# Patient Record
Sex: FEMALE | Race: WHITE
[De-identification: names, ages, dates, MRNs, and addresses within clinical notes are randomized per-mention and may not be internally consistent; named-entity substitution may affect disease eponyms.]

---

## 2020-05-03 ENCOUNTER — HOSPITAL ENCOUNTER (INPATIENT)
Dept: HOSPITAL 54 - ER | Age: 40
Discharge: LEFT BEFORE DISCHARGE | DRG: 282 | End: 2020-05-03
Attending: NURSE PRACTITIONER | Admitting: NURSE PRACTITIONER
Payer: COMMERCIAL

## 2020-05-03 VITALS — BODY MASS INDEX: 34.93 KG/M2 | HEIGHT: 61 IN | WEIGHT: 185 LBS

## 2020-05-03 VITALS — SYSTOLIC BLOOD PRESSURE: 111 MMHG | DIASTOLIC BLOOD PRESSURE: 40 MMHG

## 2020-05-03 DIAGNOSIS — I21.4: Primary | ICD-10-CM

## 2020-05-03 DIAGNOSIS — E03.9: ICD-10-CM

## 2020-05-03 DIAGNOSIS — E66.9: ICD-10-CM

## 2020-05-03 DIAGNOSIS — Z91.19: ICD-10-CM

## 2020-05-03 LAB
BASOPHILS # BLD AUTO: 0 /CMM (ref 0–0.2)
BASOPHILS NFR BLD AUTO: 0.6 % (ref 0–2)
BUN SERPL-MCNC: 10 MG/DL (ref 7–18)
CALCIUM SERPL-MCNC: 8.9 MG/DL (ref 8.5–10.1)
CHLORIDE SERPL-SCNC: 104 MMOL/L (ref 98–107)
CO2 SERPL-SCNC: 27 MMOL/L (ref 21–32)
CREAT SERPL-MCNC: 0.6 MG/DL (ref 0.6–1.3)
EOSINOPHIL NFR BLD AUTO: 2.9 % (ref 0–6)
GLUCOSE SERPL-MCNC: 106 MG/DL (ref 74–106)
HCT VFR BLD AUTO: 40 % (ref 33–45)
HGB BLD-MCNC: 13 G/DL (ref 11.5–14.8)
LYMPHOCYTES NFR BLD AUTO: 2 /CMM (ref 0.8–4.8)
LYMPHOCYTES NFR BLD AUTO: 28.8 % (ref 20–44)
MCHC RBC AUTO-ENTMCNC: 33 G/DL (ref 31–36)
MCV RBC AUTO: 81 FL (ref 82–100)
MONOCYTES NFR BLD AUTO: 0.4 /CMM (ref 0.1–1.3)
MONOCYTES NFR BLD AUTO: 5.3 % (ref 2–12)
NEUTROPHILS # BLD AUTO: 4.2 /CMM (ref 1.8–8.9)
NEUTROPHILS NFR BLD AUTO: 62.4 % (ref 43–81)
PLATELET # BLD AUTO: 250 /CMM (ref 150–450)
POTASSIUM SERPL-SCNC: 3.5 MMOL/L (ref 3.5–5.1)
RBC # BLD AUTO: 4.91 MIL/UL (ref 4–5.2)
SODIUM SERPL-SCNC: 142 MMOL/L (ref 136–145)
WBC NRBC COR # BLD AUTO: 6.8 K/UL (ref 4.3–11)

## 2020-05-03 PROCEDURE — G0378 HOSPITAL OBSERVATION PER HR: HCPCS

## 2020-05-03 NOTE — NUR
RN NOTES



PATIENT CHANGED HER MIND AND REFUSED TO TAKE ATORVASTATIN, WASTED THE MED PER PROTOCOL. 
WITNESS BY ELIGIO PYLE. 

-------------------------------------------------------------------------------

Addendum: 05/03/20 at 0959 by DAISY BUITRAGO RN

-------------------------------------------------------------------------------

EXPLAINED THE RISKS AND BENEFITS BUT PATIENT STILL REFUSED MEDICATION.

## 2020-05-03 NOTE — NUR
RN ADMITTING NOTES



ADMITTING A 38 Y/O FEMALE, A/O X4. ON ROOM AIR, TOLERATING WELL, ABLE TO MAKE NEEDS KNOWN. 
PATIENT ORIENTED TO ROOM, UNIT AND STAFF. IV ACCESS ON LAC #20, PATENT AND INTACT. V/S 
TAKEN, STABLE AND RECORDED. SKIN IS INTACT. LUNGS CLEAR ON AUSCULTATION. SAFETY MEASURES IN 
PLACE, BED IN LOWEST LOCKED POSITION WITH SIDE RAILS UP X2. CALL LIGHT PLACED WITHIN EASY 
REACH. WILL CONTINUE TO MONITOR.

## 2020-05-03 NOTE — NUR
PT BIBRA C/O MIDSTERNAL PRESSURE-LIKE CHEST PAIN RADIATING TO LEFT ARM. PT 
ENDORSES NUMBNESS ON THE LLE. -SOB. +N/V +HA. PT CONNECTED TO THE CARDIAC 
MONITOR AND POX.

## 2020-05-03 NOTE — NUR
ELIGIO STRONG NOTES



PATIENT WANTS TO LEAVE AMA, PER PATIENT SHE WANTS TO GO TO ANOTHER HOSPITAL BECAUSE ONE OF 
HER FAMILY FRIEND IS A CARDIOLOGIST AT THAT HOSPITAL. EXPLAINED THE RISKS BUT PATIENT STILL 
REFUSED. PATIENT IS A/O X4.  PATIENT SIGNED THE AMA FORM. ASSISTED TO GO TO THE LOBBY, WENT 
AMBULATORY AND PICKED UP BY .

## 2020-05-13 ENCOUNTER — HOSPITAL ENCOUNTER (EMERGENCY)
Dept: HOSPITAL 54 - ER | Age: 40
Discharge: ANOTHER HOSPITAL | End: 2020-05-13
Payer: COMMERCIAL

## 2020-05-13 VITALS — BODY MASS INDEX: 34.93 KG/M2 | WEIGHT: 185 LBS | HEIGHT: 61 IN

## 2020-05-13 VITALS — DIASTOLIC BLOOD PRESSURE: 86 MMHG | SYSTOLIC BLOOD PRESSURE: 132 MMHG

## 2020-05-13 DIAGNOSIS — Z95.5: ICD-10-CM

## 2020-05-13 DIAGNOSIS — Z88.0: ICD-10-CM

## 2020-05-13 DIAGNOSIS — Z79.899: ICD-10-CM

## 2020-05-13 DIAGNOSIS — E86.0: ICD-10-CM

## 2020-05-13 DIAGNOSIS — R94.31: ICD-10-CM

## 2020-05-13 DIAGNOSIS — Z98.890: ICD-10-CM

## 2020-05-13 DIAGNOSIS — I25.10: ICD-10-CM

## 2020-05-13 DIAGNOSIS — I24.9: Primary | ICD-10-CM

## 2020-05-13 DIAGNOSIS — E03.9: ICD-10-CM

## 2020-05-13 LAB
BASOPHILS # BLD AUTO: 0 /CMM (ref 0–0.2)
BASOPHILS NFR BLD AUTO: 0.4 % (ref 0–2)
BUN SERPL-MCNC: 11 MG/DL (ref 7–18)
CALCIUM SERPL-MCNC: 8.9 MG/DL (ref 8.5–10.1)
CHLORIDE SERPL-SCNC: 104 MMOL/L (ref 98–107)
CO2 SERPL-SCNC: 29 MMOL/L (ref 21–32)
CREAT SERPL-MCNC: 0.7 MG/DL (ref 0.6–1.3)
EOSINOPHIL NFR BLD AUTO: 4 % (ref 0–6)
GLUCOSE SERPL-MCNC: 114 MG/DL (ref 74–106)
HCT VFR BLD AUTO: 40 % (ref 33–45)
HGB BLD-MCNC: 13.1 G/DL (ref 11.5–14.8)
LYMPHOCYTES NFR BLD AUTO: 1.6 /CMM (ref 0.8–4.8)
LYMPHOCYTES NFR BLD AUTO: 21.6 % (ref 20–44)
MCHC RBC AUTO-ENTMCNC: 33 G/DL (ref 31–36)
MCV RBC AUTO: 82 FL (ref 82–100)
MONOCYTES NFR BLD AUTO: 0.4 /CMM (ref 0.1–1.3)
MONOCYTES NFR BLD AUTO: 6.1 % (ref 2–12)
NEUTROPHILS # BLD AUTO: 5 /CMM (ref 1.8–8.9)
NEUTROPHILS NFR BLD AUTO: 67.9 % (ref 43–81)
PLATELET # BLD AUTO: 312 /CMM (ref 150–450)
POTASSIUM SERPL-SCNC: 3.8 MMOL/L (ref 3.5–5.1)
RBC # BLD AUTO: 4.9 MIL/UL (ref 4–5.2)
SODIUM SERPL-SCNC: 142 MMOL/L (ref 136–145)
WBC NRBC COR # BLD AUTO: 7.3 K/UL (ref 4.3–11)

## 2020-05-13 PROCEDURE — 84703 CHORIONIC GONADOTROPIN ASSAY: CPT

## 2020-05-13 PROCEDURE — 84484 ASSAY OF TROPONIN QUANT: CPT

## 2020-05-13 PROCEDURE — 93005 ELECTROCARDIOGRAM TRACING: CPT

## 2020-05-13 PROCEDURE — 96361 HYDRATE IV INFUSION ADD-ON: CPT

## 2020-05-13 PROCEDURE — 99285 EMERGENCY DEPT VISIT HI MDM: CPT

## 2020-05-13 PROCEDURE — 80048 BASIC METABOLIC PNL TOTAL CA: CPT

## 2020-05-13 PROCEDURE — 96375 TX/PRO/DX INJ NEW DRUG ADDON: CPT

## 2020-05-13 PROCEDURE — 85025 COMPLETE CBC W/AUTO DIFF WBC: CPT

## 2020-05-13 PROCEDURE — 71045 X-RAY EXAM CHEST 1 VIEW: CPT

## 2020-05-13 PROCEDURE — 85730 THROMBOPLASTIN TIME PARTIAL: CPT

## 2020-05-13 PROCEDURE — 96374 THER/PROPH/DIAG INJ IV PUSH: CPT

## 2020-05-13 PROCEDURE — 85610 PROTHROMBIN TIME: CPT

## 2020-05-13 PROCEDURE — 36415 COLL VENOUS BLD VENIPUNCTURE: CPT

## 2020-05-13 NOTE — NUR
PT REC'D TO ER VIA EMS   C/O  CHEST PAIN  1 HOUR AGO PT WAS LYING  DOWN    8/10 
 IV  STARTED 20G  LABS DRAWN SENT TO LAB AWAITING EVALUATION BY ER PROVIDER. PT 
HAD  STENT PLACED  1 WEEK AGO AT   [facility]

## 2020-05-13 NOTE — NUR
PT  AMB WITH ASSISTANCE  TO BATHROOM  . BACK TO  BED MONITORS APPLIED     PEND 
TING TRANSFER TO  [facility] .ER  HEPARIN  GIVEN PER  PROTOCOL